# Patient Record
Sex: FEMALE | Race: OTHER | NOT HISPANIC OR LATINO | URBAN - METROPOLITAN AREA
[De-identification: names, ages, dates, MRNs, and addresses within clinical notes are randomized per-mention and may not be internally consistent; named-entity substitution may affect disease eponyms.]

---

## 2017-04-10 ENCOUNTER — OUTPATIENT (OUTPATIENT)
Dept: OUTPATIENT SERVICES | Facility: HOSPITAL | Age: 32
LOS: 1 days | End: 2017-04-10
Payer: COMMERCIAL

## 2017-04-10 DIAGNOSIS — O26.899 OTHER SPECIFIED PREGNANCY RELATED CONDITIONS, UNSPECIFIED TRIMESTER: ICD-10-CM

## 2017-04-10 DIAGNOSIS — Z3A.00 WEEKS OF GESTATION OF PREGNANCY NOT SPECIFIED: ICD-10-CM

## 2017-04-10 PROCEDURE — 99214 OFFICE O/P EST MOD 30 MIN: CPT

## 2017-04-10 PROCEDURE — 94760 N-INVAS EAR/PLS OXIMETRY 1: CPT

## 2017-04-10 PROCEDURE — 76818 FETAL BIOPHYS PROFILE W/NST: CPT

## 2017-06-21 ENCOUNTER — INPATIENT (INPATIENT)
Facility: HOSPITAL | Age: 32
LOS: 3 days | Discharge: HOME | End: 2017-06-25
Attending: OBSTETRICS & GYNECOLOGY | Admitting: OBSTETRICS & GYNECOLOGY
Payer: COMMERCIAL

## 2017-06-21 VITALS — HEIGHT: 63 IN | WEIGHT: 197.98 LBS

## 2017-06-21 DIAGNOSIS — Z3A.00 WEEKS OF GESTATION OF PREGNANCY NOT SPECIFIED: ICD-10-CM

## 2017-06-21 DIAGNOSIS — O26.899 OTHER SPECIFIED PREGNANCY RELATED CONDITIONS, UNSPECIFIED TRIMESTER: ICD-10-CM

## 2017-06-21 LAB
ALBUMIN SERPL ELPH-MCNC: 3.7 G/DL — SIGNIFICANT CHANGE UP (ref 3.3–5)
ALP SERPL-CCNC: 132 U/L — HIGH (ref 40–120)
ALT FLD-CCNC: 13 U/L — SIGNIFICANT CHANGE UP (ref 10–45)
ANION GAP SERPL CALC-SCNC: 16 MMOL/L — SIGNIFICANT CHANGE UP (ref 5–17)
AST SERPL-CCNC: 29 U/L — SIGNIFICANT CHANGE UP (ref 10–40)
BASOPHILS NFR BLD AUTO: 0.1 % — SIGNIFICANT CHANGE UP (ref 0–2)
BILIRUB SERPL-MCNC: 0.3 MG/DL — SIGNIFICANT CHANGE UP (ref 0.2–1.2)
BLD GP AB SCN SERPL QL: NEGATIVE — SIGNIFICANT CHANGE UP
BUN SERPL-MCNC: 9 MG/DL — SIGNIFICANT CHANGE UP (ref 7–23)
CALCIUM SERPL-MCNC: 8.8 MG/DL — SIGNIFICANT CHANGE UP (ref 8.4–10.5)
CHLORIDE SERPL-SCNC: 101 MMOL/L — SIGNIFICANT CHANGE UP (ref 96–108)
CO2 SERPL-SCNC: 19 MMOL/L — LOW (ref 22–31)
CREAT SERPL-MCNC: 0.5 MG/DL — SIGNIFICANT CHANGE UP (ref 0.5–1.3)
EOSINOPHIL NFR BLD AUTO: 0.7 % — SIGNIFICANT CHANGE UP (ref 0–6)
GLUCOSE SERPL-MCNC: 100 MG/DL — HIGH (ref 70–99)
HCT VFR BLD CALC: 37 % — SIGNIFICANT CHANGE UP (ref 34.5–45)
HGB BLD-MCNC: 12.1 G/DL — SIGNIFICANT CHANGE UP (ref 11.5–15.5)
LDH SERPL L TO P-CCNC: 281 U/L — HIGH (ref 50–242)
LYMPHOCYTES # BLD AUTO: 16.4 % — SIGNIFICANT CHANGE UP (ref 13–44)
MCHC RBC-ENTMCNC: 25.4 PG — LOW (ref 27–34)
MCHC RBC-ENTMCNC: 32.7 G/DL — SIGNIFICANT CHANGE UP (ref 32–36)
MCV RBC AUTO: 77.7 FL — LOW (ref 80–100)
MONOCYTES NFR BLD AUTO: 8.3 % — SIGNIFICANT CHANGE UP (ref 2–14)
NEUTROPHILS NFR BLD AUTO: 74.5 % — SIGNIFICANT CHANGE UP (ref 43–77)
PLATELET # BLD AUTO: 254 K/UL — SIGNIFICANT CHANGE UP (ref 150–400)
POTASSIUM SERPL-MCNC: 4.2 MMOL/L — SIGNIFICANT CHANGE UP (ref 3.5–5.3)
POTASSIUM SERPL-SCNC: 4.2 MMOL/L — SIGNIFICANT CHANGE UP (ref 3.5–5.3)
PROT SERPL-MCNC: 7.6 G/DL — SIGNIFICANT CHANGE UP (ref 6–8.3)
RBC # BLD: 4.76 M/UL — SIGNIFICANT CHANGE UP (ref 3.8–5.2)
RBC # FLD: 15.3 % — SIGNIFICANT CHANGE UP (ref 10.3–16.9)
RH IG SCN BLD-IMP: POSITIVE — SIGNIFICANT CHANGE UP
RH IG SCN BLD-IMP: POSITIVE — SIGNIFICANT CHANGE UP
SODIUM SERPL-SCNC: 136 MMOL/L — SIGNIFICANT CHANGE UP (ref 135–145)
URATE SERPL-MCNC: 5.6 MG/DL — SIGNIFICANT CHANGE UP (ref 2.5–7)
WBC # BLD: 13.8 K/UL — HIGH (ref 3.8–10.5)
WBC # FLD AUTO: 13.8 K/UL — HIGH (ref 3.8–10.5)

## 2017-06-21 RX ORDER — CITRIC ACID/SODIUM CITRATE 300-500 MG
15 SOLUTION, ORAL ORAL ONCE
Qty: 0 | Refills: 0 | Status: DISCONTINUED | OUTPATIENT
Start: 2017-06-21 | End: 2017-06-22

## 2017-06-21 RX ORDER — SODIUM CHLORIDE 9 MG/ML
1000 INJECTION, SOLUTION INTRAVENOUS
Qty: 0 | Refills: 0 | Status: DISCONTINUED | OUTPATIENT
Start: 2017-06-21 | End: 2017-06-22

## 2017-06-21 RX ORDER — OXYTOCIN 10 UNIT/ML
333.33 VIAL (ML) INJECTION
Qty: 20 | Refills: 0 | Status: DISCONTINUED | OUTPATIENT
Start: 2017-06-21 | End: 2017-06-22

## 2017-06-21 RX ORDER — SODIUM CHLORIDE 9 MG/ML
2000 INJECTION, SOLUTION INTRAVENOUS ONCE
Qty: 0 | Refills: 0 | Status: COMPLETED | OUTPATIENT
Start: 2017-06-21 | End: 2017-06-21

## 2017-06-21 RX ADMIN — SODIUM CHLORIDE 125 MILLILITER(S): 9 INJECTION, SOLUTION INTRAVENOUS at 22:34

## 2017-06-21 RX ADMIN — SODIUM CHLORIDE 125 MILLILITER(S): 9 INJECTION, SOLUTION INTRAVENOUS at 21:01

## 2017-06-21 RX ADMIN — SODIUM CHLORIDE 4000 MILLILITER(S): 9 INJECTION, SOLUTION INTRAVENOUS at 22:10

## 2017-06-21 NOTE — PATIENT PROFILE OB - VISION (WITH CORRECTIVE LENSES IF THE PATIENT USUALLY WEARS THEM):
Glasses for distance/Normal vision: sees adequately in most situations; can see medication labels, newsprint

## 2017-06-21 NOTE — PATIENT PROFILE OB - PRENATAL LABORATORY TESTS, INFANT PROFILE
HBsAG/blood type/VDRL/RPR/group B strep/HIV/rubella VDRL/RPR/antibody screen/HIV/blood type/group B strep/rubella/HBsAG

## 2017-06-22 LAB
APPEARANCE UR: CLEAR — SIGNIFICANT CHANGE UP
APTT BLD: 25.5 SEC — LOW (ref 27.5–37.4)
BILIRUB UR-MCNC: NEGATIVE — SIGNIFICANT CHANGE UP
COLOR SPEC: YELLOW — SIGNIFICANT CHANGE UP
DIFF PNL FLD: NEGATIVE — SIGNIFICANT CHANGE UP
FIBRINOGEN PPP-MCNC: 424 MG/DL — SIGNIFICANT CHANGE UP (ref 258–438)
GLUCOSE UR QL: NEGATIVE — SIGNIFICANT CHANGE UP
HCT VFR BLD CALC: 30 % — LOW (ref 34.5–45)
HGB BLD-MCNC: 9.7 G/DL — LOW (ref 11.5–15.5)
INR BLD: 0.9 — SIGNIFICANT CHANGE UP (ref 0.88–1.16)
KETONES UR-MCNC: 15 MG/DL
LEUKOCYTE ESTERASE UR-ACNC: NEGATIVE — SIGNIFICANT CHANGE UP
MCHC RBC-ENTMCNC: 25.5 PG — LOW (ref 27–34)
MCHC RBC-ENTMCNC: 32.3 G/DL — SIGNIFICANT CHANGE UP (ref 32–36)
MCV RBC AUTO: 78.7 FL — LOW (ref 80–100)
NITRITE UR-MCNC: NEGATIVE — SIGNIFICANT CHANGE UP
PH UR: 7 — SIGNIFICANT CHANGE UP (ref 5–8)
PLATELET # BLD AUTO: 202 K/UL — SIGNIFICANT CHANGE UP (ref 150–400)
PROT UR-MCNC: NEGATIVE MG/DL — SIGNIFICANT CHANGE UP
PROTHROM AB SERPL-ACNC: 10 SEC — SIGNIFICANT CHANGE UP (ref 9.8–12.7)
RBC # BLD: 3.81 M/UL — SIGNIFICANT CHANGE UP (ref 3.8–5.2)
RBC # FLD: 15.1 % — SIGNIFICANT CHANGE UP (ref 10.3–16.9)
SP GR SPEC: 1.01 — SIGNIFICANT CHANGE UP (ref 1–1.03)
UROBILINOGEN FLD QL: 0.2 E.U./DL — SIGNIFICANT CHANGE UP
WBC # BLD: 22.5 K/UL — HIGH (ref 3.8–10.5)
WBC # FLD AUTO: 22.5 K/UL — HIGH (ref 3.8–10.5)

## 2017-06-22 RX ORDER — CEFAZOLIN SODIUM 1 G
2000 VIAL (EA) INJECTION ONCE
Qty: 0 | Refills: 0 | Status: COMPLETED | OUTPATIENT
Start: 2017-06-22 | End: 2017-06-22

## 2017-06-22 RX ORDER — DIPHENHYDRAMINE HCL 50 MG
25 CAPSULE ORAL EVERY 6 HOURS
Qty: 0 | Refills: 0 | Status: DISCONTINUED | OUTPATIENT
Start: 2017-06-22 | End: 2017-06-25

## 2017-06-22 RX ORDER — MORPHINE SULFATE 50 MG/1
2 CAPSULE, EXTENDED RELEASE ORAL
Qty: 0 | Refills: 0 | Status: DISCONTINUED | OUTPATIENT
Start: 2017-06-22 | End: 2017-06-22

## 2017-06-22 RX ORDER — OXYTOCIN 10 UNIT/ML
41.67 VIAL (ML) INJECTION
Qty: 20 | Refills: 0 | Status: DISCONTINUED | OUTPATIENT
Start: 2017-06-22 | End: 2017-06-25

## 2017-06-22 RX ORDER — OXYTOCIN 10 UNIT/ML
333.33 VIAL (ML) INJECTION
Qty: 20 | Refills: 0 | Status: DISCONTINUED | OUTPATIENT
Start: 2017-06-22 | End: 2017-06-25

## 2017-06-22 RX ORDER — NALOXONE HYDROCHLORIDE 4 MG/.1ML
0.1 SPRAY NASAL
Qty: 0 | Refills: 0 | Status: DISCONTINUED | OUTPATIENT
Start: 2017-06-22 | End: 2017-06-22

## 2017-06-22 RX ORDER — ONDANSETRON 8 MG/1
4 TABLET, FILM COATED ORAL EVERY 6 HOURS
Qty: 0 | Refills: 0 | Status: DISCONTINUED | OUTPATIENT
Start: 2017-06-22 | End: 2017-06-22

## 2017-06-22 RX ORDER — IBUPROFEN 200 MG
600 TABLET ORAL EVERY 6 HOURS
Qty: 0 | Refills: 0 | Status: DISCONTINUED | OUTPATIENT
Start: 2017-06-22 | End: 2017-06-25

## 2017-06-22 RX ORDER — IBUPROFEN 200 MG
600 TABLET ORAL EVERY 6 HOURS
Qty: 0 | Refills: 0 | Status: DISCONTINUED | OUTPATIENT
Start: 2017-06-22 | End: 2017-06-22

## 2017-06-22 RX ORDER — DOCUSATE SODIUM 100 MG
100 CAPSULE ORAL
Qty: 0 | Refills: 0 | Status: DISCONTINUED | OUTPATIENT
Start: 2017-06-22 | End: 2017-06-25

## 2017-06-22 RX ORDER — OXYTOCIN 10 UNIT/ML
2 VIAL (ML) INJECTION
Qty: 30 | Refills: 0 | Status: DISCONTINUED | OUTPATIENT
Start: 2017-06-22 | End: 2017-06-22

## 2017-06-22 RX ORDER — ACETAMINOPHEN 500 MG
650 TABLET ORAL EVERY 6 HOURS
Qty: 0 | Refills: 0 | Status: DISCONTINUED | OUTPATIENT
Start: 2017-06-22 | End: 2017-06-25

## 2017-06-22 RX ORDER — GLYCERIN ADULT
1 SUPPOSITORY, RECTAL RECTAL AT BEDTIME
Qty: 0 | Refills: 0 | Status: DISCONTINUED | OUTPATIENT
Start: 2017-06-22 | End: 2017-06-25

## 2017-06-22 RX ORDER — CITRIC ACID/SODIUM CITRATE 300-500 MG
30 SOLUTION, ORAL ORAL ONCE
Qty: 0 | Refills: 0 | Status: COMPLETED | OUTPATIENT
Start: 2017-06-22 | End: 2017-06-22

## 2017-06-22 RX ORDER — LANOLIN
1 OINTMENT (GRAM) TOPICAL
Qty: 0 | Refills: 0 | Status: DISCONTINUED | OUTPATIENT
Start: 2017-06-22 | End: 2017-06-25

## 2017-06-22 RX ORDER — HEPARIN SODIUM 5000 [USP'U]/ML
5000 INJECTION INTRAVENOUS; SUBCUTANEOUS EVERY 12 HOURS
Qty: 0 | Refills: 0 | Status: DISCONTINUED | OUTPATIENT
Start: 2017-06-22 | End: 2017-06-25

## 2017-06-22 RX ORDER — SIMETHICONE 80 MG/1
80 TABLET, CHEWABLE ORAL EVERY 4 HOURS
Qty: 0 | Refills: 0 | Status: DISCONTINUED | OUTPATIENT
Start: 2017-06-22 | End: 2017-06-25

## 2017-06-22 RX ORDER — OXYTOCIN 10 UNIT/ML
41.67 VIAL (ML) INJECTION
Qty: 20 | Refills: 0 | Status: DISCONTINUED | OUTPATIENT
Start: 2017-06-22 | End: 2017-06-22

## 2017-06-22 RX ORDER — SODIUM CHLORIDE 9 MG/ML
1000 INJECTION, SOLUTION INTRAVENOUS
Qty: 0 | Refills: 0 | Status: DISCONTINUED | OUTPATIENT
Start: 2017-06-22 | End: 2017-06-22

## 2017-06-22 RX ORDER — SODIUM CHLORIDE 9 MG/ML
1000 INJECTION, SOLUTION INTRAVENOUS
Qty: 0 | Refills: 0 | Status: DISCONTINUED | OUTPATIENT
Start: 2017-06-22 | End: 2017-06-25

## 2017-06-22 RX ORDER — FERROUS SULFATE 325(65) MG
325 TABLET ORAL DAILY
Qty: 0 | Refills: 0 | Status: DISCONTINUED | OUTPATIENT
Start: 2017-06-22 | End: 2017-06-25

## 2017-06-22 RX ORDER — TETANUS TOXOID, REDUCED DIPHTHERIA TOXOID AND ACELLULAR PERTUSSIS VACCINE, ADSORBED 5; 2.5; 8; 8; 2.5 [IU]/.5ML; [IU]/.5ML; UG/.5ML; UG/.5ML; UG/.5ML
0.5 SUSPENSION INTRAMUSCULAR ONCE
Qty: 0 | Refills: 0 | Status: COMPLETED | OUTPATIENT
Start: 2017-06-22

## 2017-06-22 RX ADMIN — Medication 600 MILLIGRAM(S): at 06:57

## 2017-06-22 RX ADMIN — HEPARIN SODIUM 5000 UNIT(S): 5000 INJECTION INTRAVENOUS; SUBCUTANEOUS at 18:20

## 2017-06-22 RX ADMIN — Medication 2 MILLIUNIT(S)/MIN: at 00:38

## 2017-06-22 RX ADMIN — Medication 600 MILLIGRAM(S): at 12:15

## 2017-06-22 RX ADMIN — Medication 15 MILLILITER(S): at 01:08

## 2017-06-22 RX ADMIN — Medication 600 MILLIGRAM(S): at 06:25

## 2017-06-22 RX ADMIN — Medication 100 MILLIGRAM(S): at 01:10

## 2017-06-22 RX ADMIN — Medication 600 MILLIGRAM(S): at 11:21

## 2017-06-22 RX ADMIN — Medication 600 MILLIGRAM(S): at 18:20

## 2017-06-23 LAB — T PALLIDUM AB TITR SER: NEGATIVE — SIGNIFICANT CHANGE UP

## 2017-06-23 RX ADMIN — Medication 600 MILLIGRAM(S): at 23:59

## 2017-06-23 RX ADMIN — Medication 1 TABLET(S): at 13:26

## 2017-06-23 RX ADMIN — SIMETHICONE 80 MILLIGRAM(S): 80 TABLET, CHEWABLE ORAL at 00:58

## 2017-06-23 RX ADMIN — Medication 600 MILLIGRAM(S): at 13:25

## 2017-06-23 RX ADMIN — SIMETHICONE 80 MILLIGRAM(S): 80 TABLET, CHEWABLE ORAL at 13:25

## 2017-06-23 RX ADMIN — Medication 600 MILLIGRAM(S): at 14:15

## 2017-06-23 RX ADMIN — Medication 600 MILLIGRAM(S): at 23:51

## 2017-06-23 RX ADMIN — Medication 600 MILLIGRAM(S): at 00:41

## 2017-06-23 RX ADMIN — Medication 100 MILLIGRAM(S): at 21:18

## 2017-06-23 RX ADMIN — Medication 600 MILLIGRAM(S): at 01:45

## 2017-06-23 RX ADMIN — SIMETHICONE 80 MILLIGRAM(S): 80 TABLET, CHEWABLE ORAL at 06:57

## 2017-06-23 RX ADMIN — Medication 600 MILLIGRAM(S): at 07:38

## 2017-06-23 RX ADMIN — Medication 1 APPLICATION(S): at 00:40

## 2017-06-23 RX ADMIN — HEPARIN SODIUM 5000 UNIT(S): 5000 INJECTION INTRAVENOUS; SUBCUTANEOUS at 06:55

## 2017-06-23 RX ADMIN — SIMETHICONE 80 MILLIGRAM(S): 80 TABLET, CHEWABLE ORAL at 21:17

## 2017-06-23 RX ADMIN — HEPARIN SODIUM 5000 UNIT(S): 5000 INJECTION INTRAVENOUS; SUBCUTANEOUS at 18:38

## 2017-06-23 RX ADMIN — Medication 600 MILLIGRAM(S): at 06:55

## 2017-06-23 NOTE — DISCHARGE NOTE OB - CARE PROVIDER_API CALL
Anastacia Fink), Obstetrics and Gynecology  [address] [place] [address]  Phone: [phone]  Fax: [phone]

## 2017-06-23 NOTE — DISCHARGE NOTE OB - PATIENT PORTAL LINK FT
“You can access the [employer] Patient Portal, offered by [facility], by registering with the following website: [email or web address]”

## 2017-06-24 RX ADMIN — Medication 600 MILLIGRAM(S): at 18:28

## 2017-06-24 RX ADMIN — SIMETHICONE 80 MILLIGRAM(S): 80 TABLET, CHEWABLE ORAL at 22:15

## 2017-06-24 RX ADMIN — Medication 600 MILLIGRAM(S): at 07:00

## 2017-06-24 RX ADMIN — Medication 600 MILLIGRAM(S): at 06:26

## 2017-06-24 RX ADMIN — Medication 600 MILLIGRAM(S): at 12:30

## 2017-06-24 RX ADMIN — HEPARIN SODIUM 5000 UNIT(S): 5000 INJECTION INTRAVENOUS; SUBCUTANEOUS at 06:28

## 2017-06-24 RX ADMIN — Medication 325 MILLIGRAM(S): at 11:46

## 2017-06-24 RX ADMIN — Medication 600 MILLIGRAM(S): at 19:00

## 2017-06-24 RX ADMIN — Medication 600 MILLIGRAM(S): at 12:00

## 2017-06-24 RX ADMIN — SIMETHICONE 80 MILLIGRAM(S): 80 TABLET, CHEWABLE ORAL at 11:45

## 2017-06-24 RX ADMIN — HEPARIN SODIUM 5000 UNIT(S): 5000 INJECTION INTRAVENOUS; SUBCUTANEOUS at 18:27

## 2017-06-24 RX ADMIN — SIMETHICONE 80 MILLIGRAM(S): 80 TABLET, CHEWABLE ORAL at 04:19

## 2017-06-24 RX ADMIN — Medication 100 MILLIGRAM(S): at 11:45

## 2017-06-24 RX ADMIN — Medication 100 MILLIGRAM(S): at 22:15

## 2017-06-24 RX ADMIN — Medication 1 TABLET(S): at 11:45

## 2017-06-25 VITALS
TEMPERATURE: 98 F | DIASTOLIC BLOOD PRESSURE: 86 MMHG | OXYGEN SATURATION: 99 % | HEART RATE: 72 BPM | SYSTOLIC BLOOD PRESSURE: 130 MMHG | RESPIRATION RATE: 16 BRPM

## 2017-06-25 PROCEDURE — 84550 ASSAY OF BLOOD/URIC ACID: CPT

## 2017-06-25 PROCEDURE — 99214 OFFICE O/P EST MOD 30 MIN: CPT

## 2017-06-25 PROCEDURE — 36415 COLL VENOUS BLD VENIPUNCTURE: CPT

## 2017-06-25 PROCEDURE — 86900 BLOOD TYPING SEROLOGIC ABO: CPT

## 2017-06-25 PROCEDURE — 85025 COMPLETE CBC W/AUTO DIFF WBC: CPT

## 2017-06-25 PROCEDURE — 85730 THROMBOPLASTIN TIME PARTIAL: CPT

## 2017-06-25 PROCEDURE — 85027 COMPLETE CBC AUTOMATED: CPT

## 2017-06-25 PROCEDURE — 80053 COMPREHEN METABOLIC PANEL: CPT

## 2017-06-25 PROCEDURE — 86780 TREPONEMA PALLIDUM: CPT

## 2017-06-25 PROCEDURE — 81003 URINALYSIS AUTO W/O SCOPE: CPT

## 2017-06-25 PROCEDURE — 83615 LACTATE (LD) (LDH) ENZYME: CPT

## 2017-06-25 PROCEDURE — 85610 PROTHROMBIN TIME: CPT

## 2017-06-25 PROCEDURE — 86901 BLOOD TYPING SEROLOGIC RH(D): CPT

## 2017-06-25 PROCEDURE — 90715 TDAP VACCINE 7 YRS/> IM: CPT

## 2017-06-25 PROCEDURE — 86850 RBC ANTIBODY SCREEN: CPT

## 2017-06-25 PROCEDURE — 85384 FIBRINOGEN ACTIVITY: CPT

## 2017-06-25 PROCEDURE — 88307 TISSUE EXAM BY PATHOLOGIST: CPT

## 2017-06-25 RX ORDER — TETANUS TOXOID, REDUCED DIPHTHERIA TOXOID AND ACELLULAR PERTUSSIS VACCINE, ADSORBED 5; 2.5; 8; 8; 2.5 [IU]/.5ML; [IU]/.5ML; UG/.5ML; UG/.5ML; UG/.5ML
0.5 SUSPENSION INTRAMUSCULAR ONCE
Qty: 0 | Refills: 0 | Status: COMPLETED | OUTPATIENT
Start: 2017-06-25 | End: 2017-06-25

## 2017-06-25 RX ORDER — BENZOCAINE AND MENTHOL 5; 1 G/100ML; G/100ML
1 LIQUID ORAL
Qty: 0 | Refills: 0 | Status: DISCONTINUED | OUTPATIENT
Start: 2017-06-25 | End: 2017-06-25

## 2017-06-25 RX ADMIN — Medication 600 MILLIGRAM(S): at 11:50

## 2017-06-25 RX ADMIN — HEPARIN SODIUM 5000 UNIT(S): 5000 INJECTION INTRAVENOUS; SUBCUTANEOUS at 06:14

## 2017-06-25 RX ADMIN — Medication 600 MILLIGRAM(S): at 00:17

## 2017-06-25 RX ADMIN — Medication 600 MILLIGRAM(S): at 07:00

## 2017-06-25 RX ADMIN — Medication 100 MILLIGRAM(S): at 06:14

## 2017-06-25 RX ADMIN — Medication 325 MILLIGRAM(S): at 11:50

## 2017-06-25 RX ADMIN — Medication 1 TABLET(S): at 11:50

## 2017-06-25 RX ADMIN — Medication 600 MILLIGRAM(S): at 06:13

## 2017-06-25 RX ADMIN — BENZOCAINE AND MENTHOL 1 LOZENGE: 5; 1 LIQUID ORAL at 03:35

## 2017-06-25 RX ADMIN — BENZOCAINE AND MENTHOL 1 LOZENGE: 5; 1 LIQUID ORAL at 05:59

## 2017-06-25 RX ADMIN — Medication 600 MILLIGRAM(S): at 01:00

## 2017-06-25 RX ADMIN — TETANUS TOXOID, REDUCED DIPHTHERIA TOXOID AND ACELLULAR PERTUSSIS VACCINE, ADSORBED 0.5 MILLILITER(S): 5; 2.5; 8; 8; 2.5 SUSPENSION INTRAMUSCULAR at 11:50

## 2017-06-25 NOTE — PROGRESS NOTE ADULT - ASSESSMENT
A/P   31y  s/p c/s, POD #2, stable  -  Pain: PO motrin, percocet  -  GI: reg diet  -  : s/p saldaña, voiding spontaneously  -  DVT prophylaxis: ambulation, SCDs, SQH  -  Dispo: POD 3 or 4
A/P  31y  s/p , POD #0, stable  1. Pain: Motrin or Percocet prn  2. GI: advance to reg diet w/ flatus  3. : saldaña  4. DVT prophylaxis: SQH 5000u q12  5. Dispo: POD3 or 4
A/P  31y  s/p , POD #1, stable  1. Pain: Motrin or Percocet prn  2. GI: clears until flatus  3. : Voiding  4. DVT prophylaxis: SQH 5000u q12  5. Dispo: POD3 or 4
A/P  32yo  s/p c/s, POD #2  ,stable  1. Pain: well controlled on PO pain meds  2. GI: regular diet  3. : voiding  4. DVT prophylaxis: SQH, ambulation  5. Dispo: POD 3 or 4

## 2017-06-25 NOTE — PROGRESS NOTE ADULT - SUBJECTIVE AND OBJECTIVE BOX
Patient seen and evaluated. No complaints  Vital Signs Last 24 Hrs  T(C): 36.4, Max: 36.5 (06-22 @ 02:30)  T(F): 97.6, Max: 97.7 (06-22 @ 02:30)  HR: 86 (76 - 106)  BP: 109/66 (105/57 - 126/71)  BP(mean): 86 (76 - 92)  RR: 18 (17 - 18)  SpO2: 97% (94% - 99%)    Abd uterus firm, below the umbilicus, incision clean/dry/intact  Ext no calf tenderness    Plan POD 1   advance diet as tolerated  pain meds prn  ambulation encouraged
Patient evaluated at bedside.   She reports pain is well controlled with Motrin.  She denies headache, dizziness, chest pain, palpitations, shortness of breathe, nausea, vomiting or heavy vaginal bleeding.  She has a saldaña and SCDs, has yet to pass flatus, is tolerating clears, and is breastfeeding.    Physical Exam:  Vital Signs Last 24 Hrs  T(C): 36.4, Max: 36.5 (06-22 @ 02:30)  T(F): 97.6, Max: 97.7 (06-22 @ 02:30)  HR: 86 (76 - 106)  BP: 109/66 (105/57 - 126/71)  BP(mean): 86 (76 - 92)  RR: 18 (17 - 18)  SpO2: 97% (94% - 99%)    I & Os for current day (as of 06-22 @ 08:51)  =============================================  IN: 2100 ml / OUT: 1310 ml / NET: 790 ml      GA: NAD, A+0 x 3  CV: RRR  Pulm: Normal work of breathing  Breasts: soft, nontender, no palpable masses  Abd: + BS, soft, nontender, nondistended, no rebound or guarding, uterus firm at midline, 2 fb below umbilicus  Incision: well approximated, no erythema or discharge  : lochia WNL, saldaña  Extremities: no swelling or calf tenderness, SCDs                            12.1   13.8  )-----------( 254      ( 21 Jun 2017 20:56 )             37.0     06-21    136  |  101  |  9   ----------------------------<  100<H>  4.2   |  19<L>  |  0.50    Ca    8.8      21 Jun 2017 20:56    TPro  7.6  /  Alb  3.7  /  TBili  0.3  /  DBili  x   /  AST  29  /  ALT  13  /  AlkPhos  132<H>  06-21      PT/INR - ( 22 Jun 2017 01:07 )   PT: 10.0 sec;   INR: 0.90          PTT - ( 22 Jun 2017 01:07 )  PTT:25.5 sec
Patient evaluated at bedside.   She reports pain is well controlled with Motrin.  She denies headache, dizziness, chest pain, palpitations, shortness of breathe, nausea, vomiting or heavy vaginal bleeding.  She has been ambulating without assistance, voiding spontaneously, has not yet passed flatus, is tolerating clears and is breastfeeding.    Physical Exam:  Vital Signs Last 24 Hrs  T(C): 36.9, Max: 37 (06-22 @ 22:36)  T(F): 98.4, Max: 98.6 (06-22 @ 22:36)  HR: 76 (75 - 86)  BP: 89/60 (89/60 - 110/72)  BP(mean): --  RR: 18 (18 - 18)  SpO2: 98% (97% - 98%)  I & Os for 24h ending 06-23 @ 07:00  =============================================  IN: 0 ml / OUT: 3710 ml / NET: -3710 ml    I & Os for current day (as of 06-23 @ 09:49)  =============================================  IN: 0 ml / OUT: 450 ml / NET: -450 ml      GA: NAD, A+0 x 3  CV: RRR  Pulm: Normal work of breathing  Breasts: soft, nontender, no palpable masses  Abd: + BS, soft, nontender, nondistended, no rebound or guarding, uterus firm at midline, at the umbilicus  Incision: well approximated, no erythema or discharge  : lochia WNL  Extremities: no swelling or calf tenderness                            9.7    22.5  )-----------( 202      ( 22 Jun 2017 11:06 )             30.0     06-21    136  |  101  |  9   ----------------------------<  100<H>  4.2   |  19<L>  |  0.50    Ca    8.8      21 Jun 2017 20:56    TPro  7.6  /  Alb  3.7  /  TBili  0.3  /  DBili  x   /  AST  29  /  ALT  13  /  AlkPhos  132<H>  06-21      PT/INR - ( 22 Jun 2017 01:07 )   PT: 10.0 sec;   INR: 0.90          PTT - ( 22 Jun 2017 01:07 )  PTT:25.5 sec
Patient evaluated at bedside.   She reports pain is well controlled with OPM.  She has been ambulating without assistance, voiding spontaneously, passing gas, tolerating regular diet and is breastfeeding.    She denies HA, dizziness, CP, palpitations, SOB, n/v, or heavy vaginal bleeding.    Physical Exam:  vss  Gen: NAD  Abd: + BS, soft, nontender, nondistended, no rebound or guarding  Incision clean, dry and intact  uterus firm at midline,   fb below umbilicus  : lochia WNL  Extremities: no swelling or calf tenderness    pod2 cs stable
Patient evaluated at bedside.   She reports pain is well controlled with OPM.  She has been ambulating without assistance, voiding spontaneously, passing gas, tolerating regular diet and is breastfeeding.    She denies HA, dizziness, CP, palpitations, SOB, n/v, or heavy vaginal bleeding.    Physical Exam:  vss  Gen: NAD  Abd: + BS, soft, nontender, nondistended, no rebound or guarding  Incision clean, dry and intact  uterus firm at midline,   fb below umbilicus  : lochia WNL  Extremities: no swelling or calf tenderness    pod3 cs stable  plan to dc home
Patient not in bathroom x2.    Physical Exam:  Vital Signs Last 24 Hrs  T(C): 36.5, Max: 36.8 (06-23 @ 22:31)  T(F): 97.7, Max: 98.2 (06-23 @ 22:31)  HR: 80 (80 - 98)  BP: 100/66 (100/66 - 124/83)  BP(mean): --  RR: 16 (16 - 19)  SpO2: 98% (98% - 100%)                                9.7    22.5  )-----------( 202      ( 22 Jun 2017 11:06 )             30.0

## 2017-06-28 DIAGNOSIS — O48.0 POST-TERM PREGNANCY: ICD-10-CM

## 2017-06-28 DIAGNOSIS — Z34.83 ENCOUNTER FOR SUPERVISION OF OTHER NORMAL PREGNANCY, THIRD TRIMESTER: ICD-10-CM

## 2017-06-28 DIAGNOSIS — Z3A.40 40 WEEKS GESTATION OF PREGNANCY: ICD-10-CM

## 2017-07-10 LAB — SURGICAL PATHOLOGY STUDY: SIGNIFICANT CHANGE UP

## 2019-03-20 PROBLEM — Z00.00 ENCOUNTER FOR PREVENTIVE HEALTH EXAMINATION: Status: ACTIVE | Noted: 2019-03-20

## 2019-03-25 ENCOUNTER — RESULT REVIEW (OUTPATIENT)
Age: 34
End: 2019-03-25

## 2019-03-25 ENCOUNTER — INPATIENT (INPATIENT)
Facility: HOSPITAL | Age: 34
LOS: 3 days | Discharge: HOME | End: 2019-03-29
Attending: OBSTETRICS & GYNECOLOGY | Admitting: OBSTETRICS & GYNECOLOGY
Payer: COMMERCIAL

## 2019-03-25 VITALS — WEIGHT: 194.01 LBS | HEIGHT: 63 IN

## 2019-03-25 LAB
BASOPHILS # BLD AUTO: 0.02 K/UL — SIGNIFICANT CHANGE UP (ref 0–0.2)
BASOPHILS NFR BLD AUTO: 0.2 % — SIGNIFICANT CHANGE UP (ref 0–2)
BLD GP AB SCN SERPL QL: NEGATIVE — SIGNIFICANT CHANGE UP
EOSINOPHIL # BLD AUTO: 0.25 K/UL — SIGNIFICANT CHANGE UP (ref 0–0.5)
EOSINOPHIL NFR BLD AUTO: 2.3 % — SIGNIFICANT CHANGE UP (ref 0–6)
HCT VFR BLD CALC: 36.8 % — SIGNIFICANT CHANGE UP (ref 34.5–45)
HGB BLD-MCNC: 11.6 G/DL — SIGNIFICANT CHANGE UP (ref 11.5–15.5)
IMM GRANULOCYTES NFR BLD AUTO: 1.2 % — SIGNIFICANT CHANGE UP (ref 0–1.5)
LYMPHOCYTES # BLD AUTO: 2.61 K/UL — SIGNIFICANT CHANGE UP (ref 1–3.3)
LYMPHOCYTES # BLD AUTO: 24.5 % — SIGNIFICANT CHANGE UP (ref 13–44)
MCHC RBC-ENTMCNC: 26.5 PG — LOW (ref 27–34)
MCHC RBC-ENTMCNC: 31.5 GM/DL — LOW (ref 32–36)
MCV RBC AUTO: 84 FL — SIGNIFICANT CHANGE UP (ref 80–100)
MONOCYTES # BLD AUTO: 0.92 K/UL — HIGH (ref 0–0.9)
MONOCYTES NFR BLD AUTO: 8.6 % — SIGNIFICANT CHANGE UP (ref 2–14)
NEUTROPHILS # BLD AUTO: 6.74 K/UL — SIGNIFICANT CHANGE UP (ref 1.8–7.4)
NEUTROPHILS NFR BLD AUTO: 63.2 % — SIGNIFICANT CHANGE UP (ref 43–77)
NRBC # BLD: 0 /100 WBCS — SIGNIFICANT CHANGE UP (ref 0–0)
PLATELET # BLD AUTO: 220 K/UL — SIGNIFICANT CHANGE UP (ref 150–400)
RBC # BLD: 4.38 M/UL — SIGNIFICANT CHANGE UP (ref 3.8–5.2)
RBC # FLD: 15.8 % — HIGH (ref 10.3–14.5)
RH IG SCN BLD-IMP: POSITIVE — SIGNIFICANT CHANGE UP
WBC # BLD: 10.67 K/UL — HIGH (ref 3.8–10.5)
WBC # FLD AUTO: 10.67 K/UL — HIGH (ref 3.8–10.5)

## 2019-03-25 RX ORDER — DIPHENHYDRAMINE HCL 50 MG
25 CAPSULE ORAL EVERY 6 HOURS
Qty: 0 | Refills: 0 | Status: DISCONTINUED | OUTPATIENT
Start: 2019-03-25 | End: 2019-03-29

## 2019-03-25 RX ORDER — GLYCERIN ADULT
1 SUPPOSITORY, RECTAL RECTAL AT BEDTIME
Qty: 0 | Refills: 0 | Status: DISCONTINUED | OUTPATIENT
Start: 2019-03-25 | End: 2019-03-29

## 2019-03-25 RX ORDER — DOCUSATE SODIUM 100 MG
100 CAPSULE ORAL
Qty: 0 | Refills: 0 | Status: DISCONTINUED | OUTPATIENT
Start: 2019-03-25 | End: 2019-03-29

## 2019-03-25 RX ORDER — HEPARIN SODIUM 5000 [USP'U]/ML
5000 INJECTION INTRAVENOUS; SUBCUTANEOUS EVERY 12 HOURS
Qty: 0 | Refills: 0 | Status: DISCONTINUED | OUTPATIENT
Start: 2019-03-25 | End: 2019-03-29

## 2019-03-25 RX ORDER — CEFAZOLIN SODIUM 1 G
2000 VIAL (EA) INJECTION ONCE
Qty: 0 | Refills: 0 | Status: COMPLETED | OUTPATIENT
Start: 2019-03-25 | End: 2019-03-25

## 2019-03-25 RX ORDER — SODIUM CHLORIDE 9 MG/ML
1000 INJECTION, SOLUTION INTRAVENOUS
Qty: 0 | Refills: 0 | Status: DISCONTINUED | OUTPATIENT
Start: 2019-03-25 | End: 2019-03-29

## 2019-03-25 RX ORDER — NALOXONE HYDROCHLORIDE 4 MG/.1ML
0.1 SPRAY NASAL
Qty: 0 | Refills: 0 | Status: DISCONTINUED | OUTPATIENT
Start: 2019-03-25 | End: 2019-03-29

## 2019-03-25 RX ORDER — SIMETHICONE 80 MG/1
80 TABLET, CHEWABLE ORAL EVERY 4 HOURS
Qty: 0 | Refills: 0 | Status: DISCONTINUED | OUTPATIENT
Start: 2019-03-25 | End: 2019-03-29

## 2019-03-25 RX ORDER — OXYTOCIN 10 UNIT/ML
41.67 VIAL (ML) INJECTION
Qty: 20 | Refills: 0 | Status: DISCONTINUED | OUTPATIENT
Start: 2019-03-25 | End: 2019-03-29

## 2019-03-25 RX ORDER — LANOLIN
1 OINTMENT (GRAM) TOPICAL
Qty: 0 | Refills: 0 | Status: DISCONTINUED | OUTPATIENT
Start: 2019-03-25 | End: 2019-03-29

## 2019-03-25 RX ORDER — CITRIC ACID/SODIUM CITRATE 300-500 MG
30 SOLUTION, ORAL ORAL ONCE
Qty: 0 | Refills: 0 | Status: COMPLETED | OUTPATIENT
Start: 2019-03-25 | End: 2019-03-25

## 2019-03-25 RX ORDER — NALBUPHINE HYDROCHLORIDE 10 MG/ML
3 INJECTION, SOLUTION INTRAMUSCULAR; INTRAVENOUS; SUBCUTANEOUS EVERY 6 HOURS
Qty: 0 | Refills: 0 | Status: DISCONTINUED | OUTPATIENT
Start: 2019-03-25 | End: 2019-03-29

## 2019-03-25 RX ORDER — TETANUS TOXOID, REDUCED DIPHTHERIA TOXOID AND ACELLULAR PERTUSSIS VACCINE, ADSORBED 5; 2.5; 8; 8; 2.5 [IU]/.5ML; [IU]/.5ML; UG/.5ML; UG/.5ML; UG/.5ML
0.5 SUSPENSION INTRAMUSCULAR ONCE
Qty: 0 | Refills: 0 | Status: COMPLETED | OUTPATIENT
Start: 2019-03-25 | End: 2020-02-21

## 2019-03-25 RX ORDER — ONDANSETRON 8 MG/1
4 TABLET, FILM COATED ORAL EVERY 6 HOURS
Qty: 0 | Refills: 0 | Status: DISCONTINUED | OUTPATIENT
Start: 2019-03-25 | End: 2019-03-29

## 2019-03-25 RX ORDER — OXYCODONE AND ACETAMINOPHEN 5; 325 MG/1; MG/1
2 TABLET ORAL EVERY 6 HOURS
Qty: 0 | Refills: 0 | Status: DISCONTINUED | OUTPATIENT
Start: 2019-03-25 | End: 2019-03-29

## 2019-03-25 RX ORDER — OXYCODONE AND ACETAMINOPHEN 5; 325 MG/1; MG/1
1 TABLET ORAL
Qty: 0 | Refills: 0 | Status: DISCONTINUED | OUTPATIENT
Start: 2019-03-25 | End: 2019-03-29

## 2019-03-25 RX ORDER — KETOROLAC TROMETHAMINE 30 MG/ML
30 SYRINGE (ML) INJECTION ONCE
Qty: 0 | Refills: 0 | Status: DISCONTINUED | OUTPATIENT
Start: 2019-03-25 | End: 2019-03-25

## 2019-03-25 RX ORDER — SODIUM CHLORIDE 9 MG/ML
1000 INJECTION, SOLUTION INTRAVENOUS ONCE
Qty: 0 | Refills: 0 | Status: COMPLETED | OUTPATIENT
Start: 2019-03-25 | End: 2019-03-25

## 2019-03-25 RX ORDER — FERROUS SULFATE 325(65) MG
325 TABLET ORAL DAILY
Qty: 0 | Refills: 0 | Status: DISCONTINUED | OUTPATIENT
Start: 2019-03-25 | End: 2019-03-29

## 2019-03-25 RX ORDER — IBUPROFEN 200 MG
600 TABLET ORAL EVERY 6 HOURS
Qty: 0 | Refills: 0 | Status: DISCONTINUED | OUTPATIENT
Start: 2019-03-25 | End: 2019-03-29

## 2019-03-25 RX ORDER — METOCLOPRAMIDE HCL 10 MG
10 TABLET ORAL ONCE
Qty: 0 | Refills: 0 | Status: DISCONTINUED | OUTPATIENT
Start: 2019-03-25 | End: 2019-03-29

## 2019-03-25 RX ADMIN — SODIUM CHLORIDE 2000 MILLILITER(S): 9 INJECTION, SOLUTION INTRAVENOUS at 15:03

## 2019-03-25 RX ADMIN — Medication 30 MILLILITER(S): at 15:02

## 2019-03-25 RX ADMIN — Medication 125 MILLIUNIT(S)/MIN: at 17:04

## 2019-03-25 RX ADMIN — Medication 100 MILLIGRAM(S): at 15:02

## 2019-03-25 RX ADMIN — SODIUM CHLORIDE 125 MILLILITER(S): 9 INJECTION, SOLUTION INTRAVENOUS at 19:37

## 2019-03-25 RX ADMIN — Medication 30 MILLIGRAM(S): at 20:03

## 2019-03-26 LAB
HCT VFR BLD CALC: 32.2 % — LOW (ref 34.5–45)
HGB BLD-MCNC: 10.3 G/DL — LOW (ref 11.5–15.5)
MCHC RBC-ENTMCNC: 27.1 PG — SIGNIFICANT CHANGE UP (ref 27–34)
MCHC RBC-ENTMCNC: 32 GM/DL — SIGNIFICANT CHANGE UP (ref 32–36)
MCV RBC AUTO: 84.7 FL — SIGNIFICANT CHANGE UP (ref 80–100)
NRBC # BLD: 0 /100 WBCS — SIGNIFICANT CHANGE UP (ref 0–0)
PLATELET # BLD AUTO: 184 K/UL — SIGNIFICANT CHANGE UP (ref 150–400)
RBC # BLD: 3.8 M/UL — SIGNIFICANT CHANGE UP (ref 3.8–5.2)
RBC # FLD: 15.2 % — HIGH (ref 10.3–14.5)
T PALLIDUM AB TITR SER: NEGATIVE — SIGNIFICANT CHANGE UP
WBC # BLD: 16.96 K/UL — HIGH (ref 3.8–10.5)
WBC # FLD AUTO: 16.96 K/UL — HIGH (ref 3.8–10.5)

## 2019-03-26 RX ORDER — ACETAMINOPHEN 500 MG
650 TABLET ORAL EVERY 6 HOURS
Qty: 0 | Refills: 0 | Status: DISCONTINUED | OUTPATIENT
Start: 2019-03-26 | End: 2019-03-29

## 2019-03-26 RX ADMIN — Medication 600 MILLIGRAM(S): at 00:55

## 2019-03-26 RX ADMIN — OXYCODONE AND ACETAMINOPHEN 1 TABLET(S): 5; 325 TABLET ORAL at 14:49

## 2019-03-26 RX ADMIN — Medication 600 MILLIGRAM(S): at 12:20

## 2019-03-26 RX ADMIN — SIMETHICONE 80 MILLIGRAM(S): 80 TABLET, CHEWABLE ORAL at 18:13

## 2019-03-26 RX ADMIN — Medication 600 MILLIGRAM(S): at 06:23

## 2019-03-26 RX ADMIN — Medication 325 MILLIGRAM(S): at 12:21

## 2019-03-26 RX ADMIN — Medication 600 MILLIGRAM(S): at 07:00

## 2019-03-26 RX ADMIN — HEPARIN SODIUM 5000 UNIT(S): 5000 INJECTION INTRAVENOUS; SUBCUTANEOUS at 06:24

## 2019-03-26 RX ADMIN — HEPARIN SODIUM 5000 UNIT(S): 5000 INJECTION INTRAVENOUS; SUBCUTANEOUS at 18:14

## 2019-03-26 RX ADMIN — Medication 600 MILLIGRAM(S): at 00:13

## 2019-03-26 RX ADMIN — Medication 650 MILLIGRAM(S): at 11:28

## 2019-03-26 RX ADMIN — Medication 650 MILLIGRAM(S): at 12:08

## 2019-03-26 RX ADMIN — Medication 1 TABLET(S): at 12:20

## 2019-03-26 RX ADMIN — Medication 600 MILLIGRAM(S): at 18:34

## 2019-03-26 RX ADMIN — OXYCODONE AND ACETAMINOPHEN 2 TABLET(S): 5; 325 TABLET ORAL at 21:27

## 2019-03-26 RX ADMIN — Medication 600 MILLIGRAM(S): at 18:13

## 2019-03-26 RX ADMIN — SIMETHICONE 80 MILLIGRAM(S): 80 TABLET, CHEWABLE ORAL at 23:28

## 2019-03-26 RX ADMIN — Medication 600 MILLIGRAM(S): at 23:28

## 2019-03-26 RX ADMIN — Medication 600 MILLIGRAM(S): at 13:49

## 2019-03-26 RX ADMIN — Medication 100 MILLIGRAM(S): at 18:14

## 2019-03-26 RX ADMIN — OXYCODONE AND ACETAMINOPHEN 1 TABLET(S): 5; 325 TABLET ORAL at 14:33

## 2019-03-26 RX ADMIN — OXYCODONE AND ACETAMINOPHEN 2 TABLET(S): 5; 325 TABLET ORAL at 22:10

## 2019-03-26 NOTE — PROGRESS NOTE ADULT - ASSESSMENT
33y Female POD#1 s/p C/S, Uncomplicated                                     PostOp HB - pending.    1. Neuro/Pain:  OPM  2  CV:  VS per routine  3. Pulm: Encourage ISS & Ambulation  4. GI:  Advance as xiang  5. : Johansen in place, TOV  6. DVT ppx: SCDs, SQH 5000 mg BID  7. Dispo: POD #3 or #4

## 2019-03-26 NOTE — PROGRESS NOTE ADULT - ATTENDING COMMENTS
doing well                        10.3   16.96 )-----------( 184      ( 26 Mar 2019 06:52 )             32.2   oob  reg diet with flatus

## 2019-03-26 NOTE — PROGRESS NOTE ADULT - SUBJECTIVE AND OBJECTIVE BOX
Patient evaluated at bedside.   She reports pain is well controlled.  Johansen in place  No Flatus  Not OOB yet.    She denies HA, dizziness, CP, palpitations, SOB, n/v, or heavy vaginal bleeding.    Physical Exam:  Vital Signs Last 24 Hrs  T(C): 36.7 (26 Mar 2019 06:32), Max: 36.9 (25 Mar 2019 18:15)  T(F): 98 (26 Mar 2019 06:32), Max: 98.4 (25 Mar 2019 18:15)  HR: 76 (26 Mar 2019 06:32) (75 - 91)  BP: 97/62 (26 Mar 2019 06:32) (97/55 - 115/68)  RR: 18 (26 Mar 2019 06:32) (16 - 20)  SpO2: 96% (26 Mar 2019 06:32) (95% - 99%)    Gen: NAD  Abd: + BS, soft, nontender, nondistended, no rebound or guarding  Incision clean, dry and intact  uterus firm at midline  : lochia WNL  Extremities: no swelling or calf tenderness                          11.6   10.67 )-----------( 220      ( 25 Mar 2019 13:15 )             36.8       MEDICATIONS  (STANDING):  diphtheria/tetanus/pertussis (acellular) Vaccine (ADAcel) 0.5 milliLiter(s) IntraMuscular once  ferrous    sulfate 325 milliGRAM(s) Oral daily  heparin  Injectable 5000 Unit(s) SubCutaneous every 12 hours  ibuprofen  Tablet. 600 milliGRAM(s) Oral every 6 hours  lactated ringers. 1000 milliLiter(s) (125 mL/Hr) IV Continuous <Continuous>  lactated ringers. 1000 milliLiter(s) (125 mL/Hr) IV Continuous <Continuous>  oxytocin Infusion 41.667 milliUNIT(s)/Min (125 mL/Hr) IV Continuous <Continuous>  prenatal multivitamin 1 Tablet(s) Oral daily    MEDICATIONS  (PRN):  diphenhydrAMINE 25 milliGRAM(s) Oral every 6 hours PRN Itching  docusate sodium 100 milliGRAM(s) Oral two times a day PRN Stool Softening  glycerin Suppository - Adult 1 Suppository(s) Rectal at bedtime PRN Constipation  lanolin Ointment 1 Application(s) Topical every 3 hours PRN Sore Nipples  metoclopramide Injectable 10 milliGRAM(s) IV Push once PRN Nausea and/or Vomiting  nalbuphine Injectable 3 milliGRAM(s) IV Push every 6 hours PRN Pruritus  naloxone Injectable 0.1 milliGRAM(s) IV Push every 3 minutes PRN For ANY of the following changes in patient status:  A. RR LESS THAN 10 breaths per minute, B. Oxygen saturation LESS THAN 90%, C. Sedation score of 6  ondansetron Injectable 4 milliGRAM(s) IV Push every 6 hours PRN Nausea  oxyCODONE    5 mG/acetaminophen 325 mG 1 Tablet(s) Oral every 3 hours PRN Moderate Pain (4 - 6)  oxyCODONE    5 mG/acetaminophen 325 mG 2 Tablet(s) Oral every 6 hours PRN Severe Pain (7 - 10)  simethicone 80 milliGRAM(s) Chew every 4 hours PRN Gas

## 2019-03-27 RX ORDER — TETANUS TOXOID, REDUCED DIPHTHERIA TOXOID AND ACELLULAR PERTUSSIS VACCINE, ADSORBED 5; 2.5; 8; 8; 2.5 [IU]/.5ML; [IU]/.5ML; UG/.5ML; UG/.5ML; UG/.5ML
0.5 SUSPENSION INTRAMUSCULAR ONCE
Qty: 0 | Refills: 0 | Status: COMPLETED | OUTPATIENT
Start: 2019-03-27 | End: 2019-03-27

## 2019-03-27 RX ADMIN — OXYCODONE AND ACETAMINOPHEN 2 TABLET(S): 5; 325 TABLET ORAL at 03:42

## 2019-03-27 RX ADMIN — Medication 600 MILLIGRAM(S): at 23:41

## 2019-03-27 RX ADMIN — Medication 600 MILLIGRAM(S): at 18:15

## 2019-03-27 RX ADMIN — Medication 600 MILLIGRAM(S): at 06:45

## 2019-03-27 RX ADMIN — Medication 100 MILLIGRAM(S): at 12:33

## 2019-03-27 RX ADMIN — OXYCODONE AND ACETAMINOPHEN 2 TABLET(S): 5; 325 TABLET ORAL at 10:40

## 2019-03-27 RX ADMIN — Medication 600 MILLIGRAM(S): at 12:33

## 2019-03-27 RX ADMIN — SIMETHICONE 80 MILLIGRAM(S): 80 TABLET, CHEWABLE ORAL at 11:08

## 2019-03-27 RX ADMIN — OXYCODONE AND ACETAMINOPHEN 2 TABLET(S): 5; 325 TABLET ORAL at 09:43

## 2019-03-27 RX ADMIN — HEPARIN SODIUM 5000 UNIT(S): 5000 INJECTION INTRAVENOUS; SUBCUTANEOUS at 06:04

## 2019-03-27 RX ADMIN — OXYCODONE AND ACETAMINOPHEN 2 TABLET(S): 5; 325 TABLET ORAL at 21:50

## 2019-03-27 RX ADMIN — Medication 325 MILLIGRAM(S): at 12:33

## 2019-03-27 RX ADMIN — Medication 600 MILLIGRAM(S): at 19:05

## 2019-03-27 RX ADMIN — OXYCODONE AND ACETAMINOPHEN 2 TABLET(S): 5; 325 TABLET ORAL at 15:44

## 2019-03-27 RX ADMIN — OXYCODONE AND ACETAMINOPHEN 2 TABLET(S): 5; 325 TABLET ORAL at 22:30

## 2019-03-27 RX ADMIN — Medication 600 MILLIGRAM(S): at 06:04

## 2019-03-27 RX ADMIN — OXYCODONE AND ACETAMINOPHEN 2 TABLET(S): 5; 325 TABLET ORAL at 16:30

## 2019-03-27 RX ADMIN — Medication 1 TABLET(S): at 12:33

## 2019-03-27 RX ADMIN — TETANUS TOXOID, REDUCED DIPHTHERIA TOXOID AND ACELLULAR PERTUSSIS VACCINE, ADSORBED 0.5 MILLILITER(S): 5; 2.5; 8; 8; 2.5 SUSPENSION INTRAMUSCULAR at 19:45

## 2019-03-27 RX ADMIN — Medication 600 MILLIGRAM(S): at 00:10

## 2019-03-27 RX ADMIN — SIMETHICONE 80 MILLIGRAM(S): 80 TABLET, CHEWABLE ORAL at 23:42

## 2019-03-27 RX ADMIN — HEPARIN SODIUM 5000 UNIT(S): 5000 INJECTION INTRAVENOUS; SUBCUTANEOUS at 18:16

## 2019-03-27 RX ADMIN — OXYCODONE AND ACETAMINOPHEN 2 TABLET(S): 5; 325 TABLET ORAL at 04:25

## 2019-03-27 RX ADMIN — SIMETHICONE 80 MILLIGRAM(S): 80 TABLET, CHEWABLE ORAL at 19:55

## 2019-03-27 RX ADMIN — Medication 600 MILLIGRAM(S): at 13:30

## 2019-03-27 RX ADMIN — SIMETHICONE 80 MILLIGRAM(S): 80 TABLET, CHEWABLE ORAL at 15:43

## 2019-03-27 NOTE — PROGRESS NOTE ADULT - ASSESSMENT
33y Female POD#2 s/p C/S, Uncomplicated                                     PostOp HB - 10.3    1. Neuro/Pain:  OPM  2  CV:  VS per routine  3. Pulm: Encourage ISS & Ambulation  4. GI:  Reg  5. : Voiding  6. DVT ppx: SCDs, SQH 5000 mg BID  7. Dispo: POD #3 or #4

## 2019-03-27 NOTE — PROGRESS NOTE ADULT - SUBJECTIVE AND OBJECTIVE BOX
Patient evaluated at bedside.   She reports pain is well controlled with OPM.  She has been ambulating without assistance, voiding spontaneously, passing gas, tolerating regular diet and is breastfeeding.    She denies HA, dizziness, CP, palpitations, SOB, n/v, or heavy vaginal bleeding.    Physical Exam:  Vital Signs Last 24 Hrs  T(C): 36.9 (27 Mar 2019 02:00), Max: 36.9 (27 Mar 2019 02:00)  T(F): 98.4 (27 Mar 2019 02:00), Max: 98.4 (27 Mar 2019 02:00)  HR: 113 (27 Mar 2019 02:00) (70 - 113)  BP: 121/80 (27 Mar 2019 02:00) (89/55 - 121/80)  RR: 18 (27 Mar 2019 02:00) (17 - 18)  SpO2: 98% (27 Mar 2019 02:00) (96% - 99%)    Gen: NAD  Abd: + BS, soft, nontender, nondistended, no rebound or guarding  Incision clean, dry and intact  uterus firm at midline  : lochia WNL  Extremities: no swelling or calf tenderness                          10.3   16.96 )-----------( 184      ( 26 Mar 2019 06:52 )             32.2       MEDICATIONS  (STANDING):  diphtheria/tetanus/pertussis (acellular) Vaccine (ADAcel) 0.5 milliLiter(s) IntraMuscular once  ferrous    sulfate 325 milliGRAM(s) Oral daily  heparin  Injectable 5000 Unit(s) SubCutaneous every 12 hours  ibuprofen  Tablet. 600 milliGRAM(s) Oral every 6 hours  lactated ringers. 1000 milliLiter(s) (125 mL/Hr) IV Continuous <Continuous>  lactated ringers. 1000 milliLiter(s) (125 mL/Hr) IV Continuous <Continuous>  oxytocin Infusion 41.667 milliUNIT(s)/Min (125 mL/Hr) IV Continuous <Continuous>  prenatal multivitamin 1 Tablet(s) Oral daily    MEDICATIONS  (PRN):  acetaminophen   Tablet .. 650 milliGRAM(s) Oral every 6 hours PRN Mild Pain (1 - 3)  diphenhydrAMINE 25 milliGRAM(s) Oral every 6 hours PRN Itching  docusate sodium 100 milliGRAM(s) Oral two times a day PRN Stool Softening  glycerin Suppository - Adult 1 Suppository(s) Rectal at bedtime PRN Constipation  lanolin Ointment 1 Application(s) Topical every 3 hours PRN Sore Nipples  metoclopramide Injectable 10 milliGRAM(s) IV Push once PRN Nausea and/or Vomiting  nalbuphine Injectable 3 milliGRAM(s) IV Push every 6 hours PRN Pruritus  naloxone Injectable 0.1 milliGRAM(s) IV Push every 3 minutes PRN For ANY of the following changes in patient status:  A. RR LESS THAN 10 breaths per minute, B. Oxygen saturation LESS THAN 90%, C. Sedation score of 6  ondansetron Injectable 4 milliGRAM(s) IV Push every 6 hours PRN Nausea  oxyCODONE    5 mG/acetaminophen 325 mG 1 Tablet(s) Oral every 3 hours PRN Moderate Pain (4 - 6)  oxyCODONE    5 mG/acetaminophen 325 mG 2 Tablet(s) Oral every 6 hours PRN Severe Pain (7 - 10)  simethicone 80 milliGRAM(s) Chew every 4 hours PRN Gas

## 2019-03-27 NOTE — PROGRESS NOTE ADULT - SUBJECTIVE AND OBJECTIVE BOX
Patient evaluated at bedside. No acute events overnight. She reports pain is well controlled with OPM. Adequate urine output. No flatus. Positive ambulation    Physical Exam:  Vital Signs Last 24 Hrs  T(C): 36.9 (27 Mar 2019 02:00), Max: 36.9 (27 Mar 2019 02:00)  T(F): 98.4 (27 Mar 2019 02:00), Max: 98.4 (27 Mar 2019 02:00)  HR: 113 (27 Mar 2019 02:00) (70 - 113)  BP: 121/80 (27 Mar 2019 02:00) (89/55 - 121/80)  BP(mean): --  RR: 18 (27 Mar 2019 02:00) (17 - 18)  SpO2: 98% (27 Mar 2019 02:00) (97% - 99%)    GA: NAD, A+0 x 3  Abd: + BS, soft, nontender, mildly distended, no rebound or guarding, uterus firm at midline, 2 fb below umbilicus  Incision clean, dry and intact  : lochia WNL  Extremities: no swelling or calf tenderness                            10.3   16.96 )-----------( 184      ( 26 Mar 2019 06:52 )             32.2     A/P  yo 33y s/p c/s, POD #2  ,stable    Diet: clears until flatus  Pain: OPM  encouraged ambulation  OOB/SCDs/IS  Continue to monitor  Anticipate discharge POD #3 or #4

## 2019-03-28 RX ADMIN — SIMETHICONE 80 MILLIGRAM(S): 80 TABLET, CHEWABLE ORAL at 17:33

## 2019-03-28 RX ADMIN — OXYCODONE AND ACETAMINOPHEN 2 TABLET(S): 5; 325 TABLET ORAL at 04:47

## 2019-03-28 RX ADMIN — HEPARIN SODIUM 5000 UNIT(S): 5000 INJECTION INTRAVENOUS; SUBCUTANEOUS at 17:35

## 2019-03-28 RX ADMIN — Medication 600 MILLIGRAM(S): at 18:10

## 2019-03-28 RX ADMIN — OXYCODONE AND ACETAMINOPHEN 2 TABLET(S): 5; 325 TABLET ORAL at 15:58

## 2019-03-28 RX ADMIN — OXYCODONE AND ACETAMINOPHEN 2 TABLET(S): 5; 325 TABLET ORAL at 10:40

## 2019-03-28 RX ADMIN — Medication 600 MILLIGRAM(S): at 06:56

## 2019-03-28 RX ADMIN — OXYCODONE AND ACETAMINOPHEN 2 TABLET(S): 5; 325 TABLET ORAL at 09:54

## 2019-03-28 RX ADMIN — Medication 1 TABLET(S): at 12:09

## 2019-03-28 RX ADMIN — Medication 100 MILLIGRAM(S): at 12:06

## 2019-03-28 RX ADMIN — OXYCODONE AND ACETAMINOPHEN 2 TABLET(S): 5; 325 TABLET ORAL at 22:03

## 2019-03-28 RX ADMIN — Medication 325 MILLIGRAM(S): at 12:09

## 2019-03-28 RX ADMIN — Medication 600 MILLIGRAM(S): at 12:37

## 2019-03-28 RX ADMIN — Medication 600 MILLIGRAM(S): at 00:20

## 2019-03-28 RX ADMIN — SIMETHICONE 80 MILLIGRAM(S): 80 TABLET, CHEWABLE ORAL at 04:03

## 2019-03-28 RX ADMIN — OXYCODONE AND ACETAMINOPHEN 2 TABLET(S): 5; 325 TABLET ORAL at 16:28

## 2019-03-28 RX ADMIN — Medication 600 MILLIGRAM(S): at 12:07

## 2019-03-28 RX ADMIN — SIMETHICONE 80 MILLIGRAM(S): 80 TABLET, CHEWABLE ORAL at 13:51

## 2019-03-28 RX ADMIN — OXYCODONE AND ACETAMINOPHEN 2 TABLET(S): 5; 325 TABLET ORAL at 04:00

## 2019-03-28 RX ADMIN — HEPARIN SODIUM 5000 UNIT(S): 5000 INJECTION INTRAVENOUS; SUBCUTANEOUS at 06:57

## 2019-03-28 RX ADMIN — OXYCODONE AND ACETAMINOPHEN 2 TABLET(S): 5; 325 TABLET ORAL at 22:35

## 2019-03-28 RX ADMIN — Medication 600 MILLIGRAM(S): at 17:34

## 2019-03-28 RX ADMIN — SIMETHICONE 80 MILLIGRAM(S): 80 TABLET, CHEWABLE ORAL at 09:54

## 2019-03-28 RX ADMIN — Medication 600 MILLIGRAM(S): at 23:54

## 2019-03-28 NOTE — PROGRESS NOTE ADULT - SUBJECTIVE AND OBJECTIVE BOX
Patient evaluated at bedside. No acute events overnight. She reports pain is well controlled with OPM. Adequate urine output. Has not yet passed  flatus. Positive ambulation      Physical Exam:  Vital Signs Last 24 Hrs  T(C): 36.2 (28 Mar 2019 01:57), Max: 36.7 (27 Mar 2019 18:00)  T(F): 97.1 (28 Mar 2019 01:57), Max: 98 (27 Mar 2019 18:00)  HR: 98 (28 Mar 2019 01:57) (88 - 98)  BP: 125/86 (28 Mar 2019 01:57) (107/59 - 125/86)  BP(mean): --  RR: 18 (28 Mar 2019 01:57) (18 - 18)  SpO2: 100% (28 Mar 2019 01:57) (100% - 100%)    GA: NAD, A+0 x 3  Abd: + BS, soft, nontender, distended, no rebound or guarding, uterus firm at midline, 2 fb below umbilicus  Incision clean, dry and intact  : lochia WNL  Extremities: no swelling or calf tenderness      A/P  yo 33y s/p c/s, POD # 3 ,stable    Diet: clears until flatus  Pain: OPM  IV fluid: LR @ 125cc/hr  OOB/SCDs/IS  Continue to monitor

## 2019-03-28 NOTE — PROGRESS NOTE ADULT - ASSESSMENT
33y Female POD#3 s/p C/S, Uncomplicated                                     PostOp HB - 10.3    1. Neuro/Pain:  OPM  2  CV:  VS per routine  3. Pulm: Encourage ISS & Ambulation  4. GI:  Reg  5. : Voiding  6. DVT ppx: SCDs, SQH 5000 mg BID  7. Dispo: POD #3 or #4

## 2019-03-28 NOTE — LACTATION INITIAL EVALUATION - NS LACT CON REASON FOR REQ
multiparous mom/39 wk gestation baby boy, seen on dol #3. This is the mothers second child. She  her toddler for 6 months, mainly exclusively for the first three months. She would like to excluisvely breastfeed longer this time but baby has already received several bottles of formula as the mother reports he becomes fussy at the breast, and also admits that she needed to sleep so sent baby to nursery. I offered her support and encouragement. Reviewed milk production feedback system and implications of early introduction of artificial nipples. Advised rooming-in, closely watching feeding cues, increased skin to skin contact, and to offer baby small amount of expressed milk prior to feed for calming purposes as needed. Also reviewed breastfeed basics and guidelines. Baby determined by peds to have a tongue-tie. Upon assessment, baby noted to have a suspected tongue tie as evidenced by a type 2, visible lingual frenulum. Possible implications of this were explained to the mother, and handouts given for reference, including ENT provider list. Placed the baby skin to skin with the mother and assisted with positioning in a football hold. Latching strategies and the importance of acquiring a deep latch were taught. Baby was able to deeply latch and is feeding well t breast with intermittently noted swallows. Mother breasts are soft and milk is easily expressible and transitioning. All questions answered, to continue to montior and f/u as needed.

## 2019-03-28 NOTE — PROGRESS NOTE ADULT - SUBJECTIVE AND OBJECTIVE BOX
Patient evaluated at bedside.   She reports pain is well controlled with OPM.  She has been ambulating without assistance, voiding spontaneously, passing gas, tolerating regular diet and is breastfeeding.    She denies HA, dizziness, CP, palpitations, SOB, n/v, or heavy vaginal bleeding.    Physical Exam:  Vital Signs Last 24 Hrs  T(C): 36.2 (28 Mar 2019 01:57), Max: 36.7 (27 Mar 2019 18:00)  T(F): 97.1 (28 Mar 2019 01:57), Max: 98 (27 Mar 2019 18:00)  HR: 98 (28 Mar 2019 01:57) (88 - 98)  BP: 125/86 (28 Mar 2019 01:57) (107/59 - 125/86)  RR: 18 (28 Mar 2019 01:57) (18 - 18)  SpO2: 100% (28 Mar 2019 01:57) (100% - 100%)    Gen: NAD  Abd: + BS, soft, nontender, nondistended, no rebound or guarding  Incision clean, dry and intact  uterus firm at midline  : lochia WNL  Extremities: no swelling or calf tenderness        MEDICATIONS  (STANDING):  ferrous    sulfate 325 milliGRAM(s) Oral daily  heparin  Injectable 5000 Unit(s) SubCutaneous every 12 hours  ibuprofen  Tablet. 600 milliGRAM(s) Oral every 6 hours  lactated ringers. 1000 milliLiter(s) (125 mL/Hr) IV Continuous <Continuous>  lactated ringers. 1000 milliLiter(s) (125 mL/Hr) IV Continuous <Continuous>  oxytocin Infusion 41.667 milliUNIT(s)/Min (125 mL/Hr) IV Continuous <Continuous>  prenatal multivitamin 1 Tablet(s) Oral daily    MEDICATIONS  (PRN):  acetaminophen   Tablet .. 650 milliGRAM(s) Oral every 6 hours PRN Mild Pain (1 - 3)  diphenhydrAMINE 25 milliGRAM(s) Oral every 6 hours PRN Itching  docusate sodium 100 milliGRAM(s) Oral two times a day PRN Stool Softening  glycerin Suppository - Adult 1 Suppository(s) Rectal at bedtime PRN Constipation  lanolin Ointment 1 Application(s) Topical every 3 hours PRN Sore Nipples  metoclopramide Injectable 10 milliGRAM(s) IV Push once PRN Nausea and/or Vomiting  nalbuphine Injectable 3 milliGRAM(s) IV Push every 6 hours PRN Pruritus  naloxone Injectable 0.1 milliGRAM(s) IV Push every 3 minutes PRN For ANY of the following changes in patient status:  A. RR LESS THAN 10 breaths per minute, B. Oxygen saturation LESS THAN 90%, C. Sedation score of 6  ondansetron Injectable 4 milliGRAM(s) IV Push every 6 hours PRN Nausea  oxyCODONE    5 mG/acetaminophen 325 mG 1 Tablet(s) Oral every 3 hours PRN Moderate Pain (4 - 6)  oxyCODONE    5 mG/acetaminophen 325 mG 2 Tablet(s) Oral every 6 hours PRN Severe Pain (7 - 10)  simethicone 80 milliGRAM(s) Chew every 4 hours PRN Gas

## 2019-03-29 ENCOUNTER — TRANSCRIPTION ENCOUNTER (OUTPATIENT)
Age: 34
End: 2019-03-29

## 2019-03-29 VITALS
SYSTOLIC BLOOD PRESSURE: 102 MMHG | HEART RATE: 77 BPM | OXYGEN SATURATION: 99 % | TEMPERATURE: 97 F | DIASTOLIC BLOOD PRESSURE: 71 MMHG | RESPIRATION RATE: 16 BRPM

## 2019-03-29 LAB — SURGICAL PATHOLOGY STUDY: SIGNIFICANT CHANGE UP

## 2019-03-29 PROCEDURE — 88307 TISSUE EXAM BY PATHOLOGIST: CPT

## 2019-03-29 PROCEDURE — 86780 TREPONEMA PALLIDUM: CPT

## 2019-03-29 PROCEDURE — 85025 COMPLETE CBC W/AUTO DIFF WBC: CPT

## 2019-03-29 PROCEDURE — 86850 RBC ANTIBODY SCREEN: CPT

## 2019-03-29 PROCEDURE — 86901 BLOOD TYPING SEROLOGIC RH(D): CPT

## 2019-03-29 PROCEDURE — 36415 COLL VENOUS BLD VENIPUNCTURE: CPT

## 2019-03-29 PROCEDURE — 86900 BLOOD TYPING SEROLOGIC ABO: CPT

## 2019-03-29 PROCEDURE — 85027 COMPLETE CBC AUTOMATED: CPT

## 2019-03-29 PROCEDURE — 90715 TDAP VACCINE 7 YRS/> IM: CPT

## 2019-03-29 RX ORDER — LANOLIN
1 OINTMENT (GRAM) TOPICAL
Qty: 0 | Refills: 0 | COMMUNITY
Start: 2019-03-29

## 2019-03-29 RX ORDER — DOCUSATE SODIUM 100 MG
1 CAPSULE ORAL
Qty: 0 | Refills: 0 | COMMUNITY
Start: 2019-03-29

## 2019-03-29 RX ORDER — IBUPROFEN 200 MG
1 TABLET ORAL
Qty: 0 | Refills: 0 | COMMUNITY
Start: 2019-03-29

## 2019-03-29 RX ADMIN — HEPARIN SODIUM 5000 UNIT(S): 5000 INJECTION INTRAVENOUS; SUBCUTANEOUS at 06:19

## 2019-03-29 RX ADMIN — Medication 650 MILLIGRAM(S): at 01:15

## 2019-03-29 RX ADMIN — Medication 100 MILLIGRAM(S): at 12:12

## 2019-03-29 RX ADMIN — Medication 600 MILLIGRAM(S): at 12:12

## 2019-03-29 RX ADMIN — Medication 600 MILLIGRAM(S): at 00:30

## 2019-03-29 RX ADMIN — Medication 600 MILLIGRAM(S): at 06:16

## 2019-03-29 RX ADMIN — Medication 650 MILLIGRAM(S): at 01:45

## 2019-03-29 RX ADMIN — Medication 1 TABLET(S): at 12:12

## 2019-03-29 RX ADMIN — Medication 600 MILLIGRAM(S): at 08:00

## 2019-03-29 RX ADMIN — OXYCODONE AND ACETAMINOPHEN 2 TABLET(S): 5; 325 TABLET ORAL at 11:07

## 2019-03-29 RX ADMIN — Medication 325 MILLIGRAM(S): at 12:12

## 2019-03-29 RX ADMIN — OXYCODONE AND ACETAMINOPHEN 2 TABLET(S): 5; 325 TABLET ORAL at 04:15

## 2019-03-29 RX ADMIN — OXYCODONE AND ACETAMINOPHEN 2 TABLET(S): 5; 325 TABLET ORAL at 03:43

## 2019-03-29 RX ADMIN — Medication 600 MILLIGRAM(S): at 13:00

## 2019-03-29 RX ADMIN — OXYCODONE AND ACETAMINOPHEN 2 TABLET(S): 5; 325 TABLET ORAL at 10:14

## 2019-03-29 NOTE — DISCHARGE NOTE OB - MEDICATION SUMMARY - MEDICATIONS TO TAKE
I will START or STAY ON the medications listed below when I get home from the hospital:    ibuprofen 600 mg oral tablet  -- 1 tab(s) by mouth every 6 hours  -- Indication: For Ibuprofen    lanolin topical ointment  -- 1 application on skin every 3 hours, As needed, Sore Nipples  -- Indication: For Sore nipples    docusate sodium 100 mg oral capsule  -- 1 cap(s) by mouth 2 times a day, As needed, Stool Softening  -- Indication: For Stool softening

## 2019-03-29 NOTE — PROGRESS NOTE ADULT - SUBJECTIVE AND OBJECTIVE BOX
Patient evaluated at bedside.   She reports pain is well controlled with OPM.  She has been ambulating without assistance, voiding spontaneously, passing gas, tolerating regular diet and is breastfeeding.    She denies HA, dizziness, CP, palpitations, SOB, n/v, or heavy vaginal bleeding.    Physical Exam:  Vital Signs Last 24 Hrs  T(C): 36.8 (29 Mar 2019 06:00), Max: 36.9 (28 Mar 2019 19:03)  T(F): 98.3 (29 Mar 2019 06:00), Max: 98.4 (28 Mar 2019 19:03)  HR: 85 (29 Mar 2019 06:00) (85 - 96)  BP: 106/72 (29 Mar 2019 06:00) (106/71 - 130/76)  RR: 17 (29 Mar 2019 06:00) (17 - 18)  SpO2: 96% (29 Mar 2019 06:00) (96% - 100%)    Gen: NAD  Abd: + BS, soft, nontender, nondistended, no rebound or guarding  Incision clean, dry and intact  uterus firm at midline  : lochia WNL  Extremities: no swelling or calf tenderness      MEDICATIONS  (STANDING):  ferrous    sulfate 325 milliGRAM(s) Oral daily  heparin  Injectable 5000 Unit(s) SubCutaneous every 12 hours  ibuprofen  Tablet. 600 milliGRAM(s) Oral every 6 hours  lactated ringers. 1000 milliLiter(s) (125 mL/Hr) IV Continuous <Continuous>  lactated ringers. 1000 milliLiter(s) (125 mL/Hr) IV Continuous <Continuous>  oxytocin Infusion 41.667 milliUNIT(s)/Min (125 mL/Hr) IV Continuous <Continuous>  prenatal multivitamin 1 Tablet(s) Oral daily    MEDICATIONS  (PRN):  acetaminophen   Tablet .. 650 milliGRAM(s) Oral every 6 hours PRN Mild Pain (1 - 3)  diphenhydrAMINE 25 milliGRAM(s) Oral every 6 hours PRN Itching  docusate sodium 100 milliGRAM(s) Oral two times a day PRN Stool Softening  glycerin Suppository - Adult 1 Suppository(s) Rectal at bedtime PRN Constipation  lanolin Ointment 1 Application(s) Topical every 3 hours PRN Sore Nipples  metoclopramide Injectable 10 milliGRAM(s) IV Push once PRN Nausea and/or Vomiting  nalbuphine Injectable 3 milliGRAM(s) IV Push every 6 hours PRN Pruritus  naloxone Injectable 0.1 milliGRAM(s) IV Push every 3 minutes PRN For ANY of the following changes in patient status:  A. RR LESS THAN 10 breaths per minute, B. Oxygen saturation LESS THAN 90%, C. Sedation score of 6  ondansetron Injectable 4 milliGRAM(s) IV Push every 6 hours PRN Nausea  oxyCODONE    5 mG/acetaminophen 325 mG 1 Tablet(s) Oral every 3 hours PRN Moderate Pain (4 - 6)  oxyCODONE    5 mG/acetaminophen 325 mG 2 Tablet(s) Oral every 6 hours PRN Severe Pain (7 - 10)  simethicone 80 milliGRAM(s) Chew every 4 hours PRN Gas

## 2019-03-29 NOTE — DISCHARGE NOTE OB - CARE PLAN
Principal Discharge DX:	Postpartum state  Goal:	Safe discharge home. To feel well.  Assessment and plan of treatment:	Meeting all postpartum milestones. Safe for discharge home. Follow up in 6 weeks.

## 2019-03-29 NOTE — DISCHARGE NOTE OB - PLAN OF CARE
Safe discharge home. To feel well. Meeting all postpartum milestones. Safe for discharge home. Follow up in 6 weeks.

## 2019-03-29 NOTE — DISCHARGE NOTE OB - CARE PROVIDER_API CALL
Joanna Kruse)  Obstetrics and Gynecology  [address] [place] [address]  Phone: [phone]  Fax: [phone]  Follow Up Time:

## 2019-03-29 NOTE — DISCHARGE NOTE OB - PATIENT PORTAL LINK FT
You can access the [facility] Patient Portal, offered by [facility], by registering with the following website: [email or web address]

## 2019-03-29 NOTE — PROGRESS NOTE ADULT - ASSESSMENT
33y Female POD#4 s/p C/S, Uncomplicated                                     PostOp HB - 10.3    1. Neuro/Pain:  OPM  2  CV:  VS per routine  3. Pulm: Encourage ISS & Ambulation  4. GI:  Reg  5. : Voiding  6. DVT ppx: SCDs, SQH 5000 mg BID  7. Dispo: POD #4

## 2019-04-01 DIAGNOSIS — Z34.83 ENCOUNTER FOR SUPERVISION OF OTHER NORMAL PREGNANCY, THIRD TRIMESTER: ICD-10-CM

## 2019-04-01 DIAGNOSIS — Z3A.39 39 WEEKS GESTATION OF PREGNANCY: ICD-10-CM

## 2019-04-01 DIAGNOSIS — O34.211 MATERNAL CARE FOR LOW TRANSVERSE SCAR FROM PREVIOUS CESAREAN DELIVERY: ICD-10-CM

## 2021-02-12 NOTE — DISCHARGE NOTE OB - CALL YOUR HEALTHCARE PROVIDER IF YOU ARE EXPERIENCING SYMPTOMS OF DEPRESSION THAT LAST MORE THAN THREE DAYS
Statement Selected Minocycline Counseling: Patient advised regarding possible photosensitivity and discoloration of the teeth, skin, lips, tongue and gums.  Patient instructed to avoid sunlight, if possible.  When exposed to sunlight, patients should wear protective clothing, sunglasses, and sunscreen.  The patient was instructed to call the office immediately if the following severe adverse effects occur:  hearing changes, easy bruising/bleeding, severe headache, or vision changes.  The patient verbalized understanding of the proper use and possible adverse effects of minocycline.  All of the patient's questions and concerns were addressed.

## 2024-04-28 ENCOUNTER — NON-APPOINTMENT (OUTPATIENT)
Age: 39
End: 2024-04-28
